# Patient Record
(demographics unavailable — no encounter records)

---

## 2024-10-10 NOTE — PHYSICAL EXAM
[Normal Sclera/Conjunctiva] : normal sclera/conjunctiva [Penis Abnormality] : normal circumcised penis [Testes Tenderness] : no tenderness of the testes [Testes Mass (___cm)] : there were no testicular masses [Prostate Enlargement] : the prostate was not enlarged [Prostate Tenderness] : the prostate was not tender [No Prostate Nodules] : no prostate nodules [Normal] : no CVA or spinal tenderness

## 2024-10-10 NOTE — HISTORY OF PRESENT ILLNESS
[FreeTextEntry1] : Past 3 days - wakes up around 1:00 with need to urinate - does - but does not feel it relieves his symptoms and takes about 40 minutes to go away - can urinate in-between. No hematuria. Some discomfort at tip of penis. Not sexually active and has never been Has masturbated 2of the three days. Not sick and no fever  Limited caffeine Urination is normal during the day.

## 2024-10-10 NOTE — ASSESSMENT
[FreeTextEntry1] : Pt with some urinary discomfort at night - OK during the day Urine sent to lab To avoid masturbation Can try Pyridium at night..